# Patient Record
Sex: FEMALE | Race: WHITE | URBAN - METROPOLITAN AREA
[De-identification: names, ages, dates, MRNs, and addresses within clinical notes are randomized per-mention and may not be internally consistent; named-entity substitution may affect disease eponyms.]

---

## 2021-11-02 ENCOUNTER — OFFICE VISIT (OUTPATIENT)
Dept: URBAN - METROPOLITAN AREA CLINIC 27 | Facility: CLINIC | Age: 71
End: 2021-11-02
Payer: MEDICARE

## 2021-11-02 DIAGNOSIS — Z96.1 PRESENCE OF PSEUDOPHAKIA: ICD-10-CM

## 2021-11-02 DIAGNOSIS — H35.371 PUCKERING OF MACULA, RIGHT EYE: ICD-10-CM

## 2021-11-02 DIAGNOSIS — H35.351 CYSTOID MACULAR DEGENERATION, RIGHT EYE: Primary | ICD-10-CM

## 2021-11-02 DIAGNOSIS — H25.12 AGE-RELATED NUCLEAR CATARACT, LEFT EYE: ICD-10-CM

## 2021-11-02 PROCEDURE — 99213 OFFICE O/P EST LOW 20 MIN: CPT | Performed by: OPHTHALMOLOGY

## 2021-11-02 PROCEDURE — 92134 CPTRZ OPH DX IMG PST SGM RTA: CPT | Performed by: OPHTHALMOLOGY

## 2021-11-02 ASSESSMENT — INTRAOCULAR PRESSURE
OD: 20
OS: 17

## 2021-11-02 NOTE — IMPRESSION/PLAN
Impression: Age-related nuclear cataract, left eye: H25.12. OS. Plan: Observe. Followed by Dr. Bruno Monterroso.

## 2021-11-02 NOTE — IMPRESSION/PLAN
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 OD
s/p STK OD, last 1/30/18 Plan: Exam and OCT show slowly improving CME OD. Last STK OD 01/30/18. Discussed options, including repeat STK, Triesence, and observation. Given minimal symptoms of distortion, the patient elects to continue observation without treatment. She will notify us of any vision worsening in the future. 

RTC PRN

## 2021-11-02 NOTE — IMPRESSION/PLAN
Impression: Puckering of macula, right eye: H35.371. OD.
s/p PPV,MP   02/24/2016 Plan: Stable. Observe.